# Patient Record
Sex: FEMALE | Race: WHITE | NOT HISPANIC OR LATINO | Employment: OTHER | URBAN - METROPOLITAN AREA
[De-identification: names, ages, dates, MRNs, and addresses within clinical notes are randomized per-mention and may not be internally consistent; named-entity substitution may affect disease eponyms.]

---

## 2022-03-02 ENCOUNTER — APPOINTMENT (OUTPATIENT)
Dept: LAB | Facility: MEDICAL CENTER | Age: 62
End: 2022-03-02
Payer: COMMERCIAL

## 2022-03-02 DIAGNOSIS — E11.9 DIABETES MELLITUS WITHOUT COMPLICATION (HCC): ICD-10-CM

## 2022-03-02 PROCEDURE — 83036 HEMOGLOBIN GLYCOSYLATED A1C: CPT

## 2022-03-02 PROCEDURE — 36415 COLL VENOUS BLD VENIPUNCTURE: CPT

## 2022-03-03 LAB
EST. AVERAGE GLUCOSE BLD GHB EST-MCNC: 134 MG/DL
HBA1C MFR BLD: 6.3 %

## 2022-04-22 ENCOUNTER — HOSPITAL ENCOUNTER (EMERGENCY)
Facility: HOSPITAL | Age: 62
Discharge: HOME | End: 2022-04-22
Attending: EMERGENCY MEDICINE | Admitting: EMERGENCY MEDICINE
Payer: COMMERCIAL

## 2022-04-22 VITALS
TEMPERATURE: 98.9 F | RESPIRATION RATE: 18 BRPM | OXYGEN SATURATION: 96 % | HEART RATE: 67 BPM | DIASTOLIC BLOOD PRESSURE: 84 MMHG | SYSTOLIC BLOOD PRESSURE: 176 MMHG

## 2022-04-22 DIAGNOSIS — J02.9 PHARYNGITIS: Primary | ICD-10-CM

## 2022-04-22 DIAGNOSIS — U07.1 COVID: ICD-10-CM

## 2022-04-22 PROCEDURE — 99284 EMERGENCY DEPT VISIT MOD MDM: CPT

## 2022-04-22 PROCEDURE — 99284 EMERGENCY DEPT VISIT MOD MDM: CPT | Performed by: EMERGENCY MEDICINE

## 2022-04-22 RX ORDER — LIDOCAINE HYDROCHLORIDE 20 MG/ML
15 SOLUTION OROPHARYNGEAL 4 TIMES DAILY PRN
Status: DISCONTINUED | OUTPATIENT
Start: 2022-04-22 | End: 2022-04-23 | Stop reason: HOSPADM

## 2022-04-22 RX ADMIN — LIDOCAINE HYDROCHLORIDE 15 ML: 20 SOLUTION ORAL; TOPICAL at 21:26

## 2022-04-23 NOTE — ED ATTENDING ATTESTATION
4/22/2022  IBuddy MD, saw and evaluated the patient  I have discussed the patient with the resident/non-physician practitioner and agree with the resident's/non-physician practitioner's findings, Plan of Care, and MDM as documented in the resident's/non-physician practitioner's note, except where noted  All available labs and Radiology studies were reviewed  I was present for key portions of any procedure(s) performed by the resident/non-physician practitioner and I was immediately available to provide assistance  At this point I agree with the current assessment done in the Emergency Department  I have conducted an independent evaluation of this patient a history and physical is as follows:    S:  Chief Complaint   Patient presents with    Shortness of Breath     Pt to ED with c/o positive covid 3 days ago, pt c/o SOB, per pt "I just want to make sure I don't get pneumonia"     Laisha Ashley is a 59 y o  female who presents with the chief complaint of a worsening sore throat  She began to have some fatigue five days ago and then developed shortness of breath, myalgias and sore throat  She tested positive for COVID 3 days ago  Patient is immunized and boosted against covid  She is not in respiratory distress and is satting 96% on room air at rest      O:  ED Triage Vitals [04/22/22 1930]   Temperature Pulse Respirations Blood Pressure SpO2   98 9 °F (37 2 °C) 67 18 (!) 176/84 96 %      Temp src Heart Rate Source Patient Position - Orthostatic VS BP Location FiO2 (%)   -- -- -- -- --      Pain Score       --         Physical Exam  Vitals and nursing note reviewed  Constitutional:       General: She is in acute distress (mild)  Appearance: She is well-developed  HENT:      Head: Normocephalic and atraumatic  Eyes:      Pupils: Pupils are equal, round, and reactive to light  Neck:      Vascular: No JVD  Cardiovascular:      Rate and Rhythm: Normal rate and regular rhythm        Heart sounds: Normal heart sounds  No murmur heard  No friction rub  No gallop  Pulmonary:      Effort: Pulmonary effort is normal  No respiratory distress  Breath sounds: Normal breath sounds  No wheezing or rales  Chest:      Chest wall: No tenderness  Musculoskeletal:         General: No tenderness  Normal range of motion  Cervical back: Normal range of motion  Skin:     General: Skin is warm and dry  Neurological:      General: No focal deficit present  Mental Status: She is alert and oriented to person, place, and time  Psychiatric:         Behavior: Behavior normal          Thought Content: Thought content normal          Judgment: Judgment normal        A/P:  Background: 59 y o  female presents with covid and covid related symptoms    Plan: ambulatory pulse ox - overall she looks well, if she remains above 90-92% on room air with ambulation, will likely dc with novel oral therapeutic prescription  ED Course         Critical Care Time  Procedures    Time reflects when diagnosis was documented in both MDM as applicable and the Disposition within this note     Time User Action Codes Description Comment    4/22/2022  9:44 PM Chula Jones Add [J02 9] Pharyngitis     4/22/2022  9:44 PM Chula Jones Add [U07 1] [facility]       ED Disposition     ED Disposition Condition Date/Time Comment    Discharge Stable Fri Apr 22, 2022  9:44 PM Javier Lugo discharge to home/self care              Follow-up Information     Follow up With Specialties Details Why Contact Info Additional [address], [place]  Call  As needed [phone]       [facility] Emergency Department Emergency Medicine Go to  If symptoms worsen as discussed [address] Emergency Department,  [address], [place], [place], [address]

## 2022-04-23 NOTE — ED PROVIDER NOTES
History  Chief Complaint   Patient presents with    Shortness of Breath     Pt to ED with c/o positive covid 3 days ago, pt c/o SOB, per pt "I just want to make sure I don't get pneumonia"     75-year-old female with past well history of hypertension, diabetes presents for evaluation of sore throat, cough, shortness of breath in the setting of a positive home COVID test 2 days ago  Patient reports initial symptoms were 4 days ago with generalized weakness, fatigue and then began to have sore throat, dry cough 1-2 days after these initial symptoms  Patient reports she has received COVID vaccinations and booster  Reports fever however denies N/V/D, abdominal pain, chest pain, headache, vision changes or any other symptoms  She states she is especially concerned that she may have strep throat due to severe sore throat causing difficulty eating at baseline due to pain  No other concerns  None       Past Medical History:   Diagnosis Date    Diabetes mellitus ([facility] [address] )     Hypertension     Psychiatric disorder        Past Surgical History:   Procedure Laterality Date    WRIST SURGERY         History reviewed  No pertinent family history  I have reviewed and agree with the history as documented  E-Cigarette/Vaping     E-Cigarette/Vaping Substances     Social History     Tobacco Use    Smoking status: Former Smoker    Smokeless tobacco: Never Used   Substance Use Topics    Alcohol use: Yes     Comment: socially    Drug use: Never        Review of Systems   Constitutional: Positive for fatigue and fever  Negative for chills  HENT: Positive for sore throat  Eyes: Negative  Respiratory: Positive for cough and shortness of breath  Cardiovascular: Negative  Negative for chest pain and palpitations  Gastrointestinal: Negative  Negative for abdominal pain, constipation, diarrhea, nausea and vomiting  Endocrine: Negative  Genitourinary: Negative  Negative for dysuria     Musculoskeletal: Negative  Skin: Negative  Negative for rash  Allergic/Immunologic: Negative  Neurological: Negative  Negative for dizziness, syncope, weakness, numbness and headaches  Hematological: Negative  Psychiatric/Behavioral: Negative  All other systems reviewed and are negative  Physical Exam  ED Triage Vitals [04/22/22 1930]   Temperature Pulse Respirations Blood Pressure SpO2   98 9 °F (37 2 °C) 67 18 (!) 176/84 96 %      Temp src Heart Rate Source Patient Position - Orthostatic VS BP Location FiO2 (%)   -- -- -- -- --      Pain Score       --             Orthostatic Vital Signs  Vitals:    04/22/22 1930   BP: (!) 176/84   Pulse: 67       Physical Exam  Vitals and nursing note reviewed  Constitutional:       General: She is not in acute distress  Appearance: She is well-developed  She is obese  She is not ill-appearing, toxic-appearing or diaphoretic  HENT:      Head: Normocephalic and atraumatic  Mouth/Throat:      Mouth: Mucous membranes are moist       Pharynx: Oropharynx is clear  No pharyngeal swelling or oropharyngeal exudate  Eyes:      Extraocular Movements: Extraocular movements intact  Conjunctiva/sclera: Conjunctivae normal       Pupils: Pupils are equal, round, and reactive to light  Neck:      Thyroid: No thyromegaly  Cardiovascular:      Rate and Rhythm: Normal rate and regular rhythm  Heart sounds: Normal heart sounds  No murmur heard  Pulmonary:      Effort: Pulmonary effort is normal  No respiratory distress  Breath sounds: Normal breath sounds  Chest:      Chest wall: No tenderness  Abdominal:      Palpations: Abdomen is soft  Tenderness: There is no abdominal tenderness  Musculoskeletal:         General: Normal range of motion  Cervical back: Normal range of motion and neck supple  Right lower leg: No tenderness  No edema  Left lower leg: No tenderness  No edema     Lymphadenopathy:      Cervical: No cervical adenopathy  Skin:     General: Skin is warm and dry  Capillary Refill: Capillary refill takes less than 2 seconds  Neurological:      Mental Status: She is alert and oriented to person, place, and time  Psychiatric:         Mood and Affect: Mood normal          Behavior: Behavior normal          ED Medications  Medications   Lidocaine Viscous HCl (XYLOCAINE) 2 % mucosal solution 15 mL (15 mL Swish & Swallow Given 4/22/22 2126)       Diagnostic Studies  Results Reviewed     None                 No orders to display         Procedures  Procedures      ED Course                                       MDM  Number of Diagnoses or Management Options  COVID  Pharyngitis  Diagnosis management comments: 25-year-old female with past well history of hypertension, diabetes presents for evaluation of sore throat, cough, shortness of breath in the setting of a positive home COVID test 2 days ago  Patient remained stable throughout ED course  SpO2 greater than 95% on room air at rest and with exertion  Clear oropharynx, clear lungs, overall well-appearing on exam   Due to symptom onset of less than 5 days with positive COVID test, patient was given prescription for Paxlovid  Patient was given viscous lidocaine for sore throat here in ED with Rx Hurricaine spray for further symptomatic management  Return to ER precautions given including SpO2 was 90% at rest on room air  All questions answered  No other concerns      Risk of Complications, Morbidity, and/or Mortality  Presenting problems: moderate  Diagnostic procedures: moderate  Management options: moderate    Patient Progress  Patient progress: stable      Disposition  Final diagnoses:   Pharyngitis   COVID     Time reflects when diagnosis was documented in both MDM as applicable and the Disposition within this note     Time User Action Codes Description Comment    4/22/2022  9:44 PM [facility] Add [J02 9] Pharyngitis     4/22/2022  9:44 PM Nancy Atkinson Add [U07 1] COVID       ED Disposition     ED Disposition Condition Date/Time Comment    Discharge Stable Fri Apr 22, 2022  9:44 PM Sana Chaney discharge to home/self care  Follow-up Information     Follow up With Specialties Details Why Contact Info Additional [address], [place]  Call  As needed [address] [facility] 107 Emergency Department Emergency Medicine Go to  If symptoms worsen as discussed 2220 [facility] [address] [facility] Emergency Department, [address], [place], [place], [address]          Discharge Medication List as of 4/22/2022 10:04 PM      START taking these medications    Details   benzocaine (HURRICAINE) 20 % mucosal spray 1 spray by Mucosal route 2 (two) times a day as needed for mucositis for up to 5 days, Starting Fri 4/22/2022, Until Wed 4/27/2022 at 2359, Normal      nirmatrelvir & ritonavir (Paxlovid) tablet therapy pack Take 3 tablets by mouth 2 (two) times a day for 5 days Take 2 nirmatrelvir tablets + 1 ritonavir tablet together per dose, Starting Fri 4/22/2022, Until Wed 4/27/2022, Normal           No discharge procedures on file  PDMP Review     None           ED Provider  Attending physically available and evaluated Sana Chaney  I managed the patient along with the ED Attending      Electronically Signed by         Leonel Drew MD  04/23/22 3225